# Patient Record
Sex: MALE | Race: WHITE | NOT HISPANIC OR LATINO | Employment: OTHER | URBAN - NONMETROPOLITAN AREA
[De-identification: names, ages, dates, MRNs, and addresses within clinical notes are randomized per-mention and may not be internally consistent; named-entity substitution may affect disease eponyms.]

---

## 2024-02-14 ENCOUNTER — HOSPITAL ENCOUNTER (OUTPATIENT)
Dept: MRI IMAGING | Facility: OTHER | Age: 74
Discharge: HOME | End: 2024-02-14
Attending: STUDENT IN AN ORGANIZED HEALTH CARE EDUCATION/TRAINING PROGRAM | Admitting: STUDENT IN AN ORGANIZED HEALTH CARE EDUCATION/TRAINING PROGRAM
Payer: MEDICARE

## 2024-02-14 DIAGNOSIS — R97.20 ELEVATED PSA: ICD-10-CM

## 2024-02-14 PROCEDURE — 76377 3D RENDER W/INTRP POSTPROCES: CPT

## 2024-02-14 PROCEDURE — A9575 INJ GADOTERATE MEGLUMI 0.1ML: HCPCS | Mod: JZ

## 2024-02-14 PROCEDURE — 255N000002 HC RX 255 OP 636: Mod: JZ

## 2024-02-14 RX ORDER — GADOTERATE MEGLUMINE 376.9 MG/ML
20 INJECTION INTRAVENOUS ONCE
Status: COMPLETED | OUTPATIENT
Start: 2024-02-14 | End: 2024-02-14

## 2024-02-14 RX ADMIN — GADOTERATE MEGLUMINE 20 ML: 376.9 INJECTION INTRAVENOUS at 17:17

## 2024-08-15 RX ORDER — METOPROLOL SUCCINATE 100 MG/1
100 TABLET, EXTENDED RELEASE ORAL DAILY
COMMUNITY

## 2024-08-15 RX ORDER — NIFEDIPINE 30 MG
30 TABLET, EXTENDED RELEASE ORAL DAILY
COMMUNITY

## 2024-08-15 RX ORDER — ALLOPURINOL 100 MG/1
100 TABLET ORAL DAILY
COMMUNITY

## 2024-08-15 RX ORDER — LISINOPRIL 30 MG/1
30 TABLET ORAL DAILY
COMMUNITY

## 2024-08-15 RX ORDER — COLCHICINE 0.6 MG/1
0.6 TABLET ORAL SEE ADMIN INSTRUCTIONS
COMMUNITY

## 2024-08-15 RX ORDER — VALACYCLOVIR HYDROCHLORIDE 1 G/1
1000 TABLET, FILM COATED ORAL DAILY PRN
Status: ON HOLD | COMMUNITY
End: 2024-08-19

## 2024-08-15 RX ORDER — ROSUVASTATIN CALCIUM 10 MG/1
10 TABLET, COATED ORAL DAILY
COMMUNITY

## 2024-08-17 ENCOUNTER — ANESTHESIA EVENT (OUTPATIENT)
Dept: SURGERY | Facility: HOSPITAL | Age: 74
End: 2024-08-17
Payer: MEDICARE

## 2024-08-19 ENCOUNTER — ANESTHESIA (OUTPATIENT)
Dept: SURGERY | Facility: HOSPITAL | Age: 74
End: 2024-08-19
Payer: MEDICARE

## 2024-08-19 ENCOUNTER — HOSPITAL ENCOUNTER (OUTPATIENT)
Facility: HOSPITAL | Age: 74
Discharge: HOME | End: 2024-08-20
Attending: UROLOGY | Admitting: UROLOGY
Payer: MEDICARE

## 2024-08-19 DIAGNOSIS — C61 PROSTATE CANCER (H): Primary | ICD-10-CM

## 2024-08-19 LAB — PLATELET # BLD AUTO: 171 10E3/UL (ref 150–450)

## 2024-08-19 PROCEDURE — 44180 LAP ENTEROLYSIS: CPT | Performed by: NURSE ANESTHETIST, CERTIFIED REGISTERED

## 2024-08-19 PROCEDURE — 272N000001 HC OR GENERAL SUPPLY STERILE: Performed by: UROLOGY

## 2024-08-19 PROCEDURE — 250N000011 HC RX IP 250 OP 636: Performed by: NURSE PRACTITIONER

## 2024-08-19 PROCEDURE — 36415 COLL VENOUS BLD VENIPUNCTURE: CPT | Performed by: UROLOGY

## 2024-08-19 PROCEDURE — 250N000009 HC RX 250: Performed by: NURSE ANESTHETIST, CERTIFIED REGISTERED

## 2024-08-19 PROCEDURE — 250N000013 HC RX MED GY IP 250 OP 250 PS 637: Performed by: UROLOGY

## 2024-08-19 PROCEDURE — 999N000141 HC STATISTIC PRE-PROCEDURE NURSING ASSESSMENT: Performed by: UROLOGY

## 2024-08-19 PROCEDURE — 250N000011 HC RX IP 250 OP 636: Performed by: UROLOGY

## 2024-08-19 PROCEDURE — 370N000017 HC ANESTHESIA TECHNICAL FEE, PER MIN: Performed by: UROLOGY

## 2024-08-19 PROCEDURE — 250N000011 HC RX IP 250 OP 636: Performed by: ANESTHESIOLOGY

## 2024-08-19 PROCEDURE — 258N000003 HC RX IP 258 OP 636: Performed by: ANESTHESIOLOGY

## 2024-08-19 PROCEDURE — 258N000003 HC RX IP 258 OP 636: Performed by: UROLOGY

## 2024-08-19 PROCEDURE — 85049 AUTOMATED PLATELET COUNT: CPT | Performed by: UROLOGY

## 2024-08-19 PROCEDURE — 250N000025 HC SEVOFLURANE, PER MIN: Performed by: UROLOGY

## 2024-08-19 PROCEDURE — 360N000080 HC SURGERY LEVEL 7, PER MIN: Performed by: UROLOGY

## 2024-08-19 PROCEDURE — 99100 ANES PT EXTEME AGE<1 YR&>70: CPT | Performed by: NURSE ANESTHETIST, CERTIFIED REGISTERED

## 2024-08-19 PROCEDURE — 250N000011 HC RX IP 250 OP 636: Performed by: NURSE ANESTHETIST, CERTIFIED REGISTERED

## 2024-08-19 PROCEDURE — 250N000013 HC RX MED GY IP 250 OP 250 PS 637: Performed by: ANESTHESIOLOGY

## 2024-08-19 PROCEDURE — 258N000003 HC RX IP 258 OP 636: Performed by: NURSE ANESTHETIST, CERTIFIED REGISTERED

## 2024-08-19 PROCEDURE — 272N000004 HC RX 272: Performed by: UROLOGY

## 2024-08-19 PROCEDURE — 250N000009 HC RX 250: Performed by: UROLOGY

## 2024-08-19 PROCEDURE — 44180 LAP ENTEROLYSIS: CPT | Performed by: ANESTHESIOLOGY

## 2024-08-19 PROCEDURE — 710N000009 HC RECOVERY PHASE 1, LEVEL 1, PER MIN: Performed by: UROLOGY

## 2024-08-19 PROCEDURE — 88309 TISSUE EXAM BY PATHOLOGIST: CPT | Mod: TC | Performed by: UROLOGY

## 2024-08-19 RX ORDER — ONDANSETRON 2 MG/ML
4 INJECTION INTRAMUSCULAR; INTRAVENOUS EVERY 30 MIN PRN
Status: DISCONTINUED | OUTPATIENT
Start: 2024-08-19 | End: 2024-08-19 | Stop reason: HOSPADM

## 2024-08-19 RX ORDER — DEXAMETHASONE SODIUM PHOSPHATE 4 MG/ML
4 INJECTION, SOLUTION INTRA-ARTICULAR; INTRALESIONAL; INTRAMUSCULAR; INTRAVENOUS; SOFT TISSUE
Status: DISCONTINUED | OUTPATIENT
Start: 2024-08-19 | End: 2024-08-19 | Stop reason: HOSPADM

## 2024-08-19 RX ORDER — BUPIVACAINE HYDROCHLORIDE 5 MG/ML
INJECTION, SOLUTION PERINEURAL PRN
Status: DISCONTINUED | OUTPATIENT
Start: 2024-08-19 | End: 2024-08-19 | Stop reason: HOSPADM

## 2024-08-19 RX ORDER — ACETAMINOPHEN 325 MG/1
650 TABLET ORAL EVERY 4 HOURS PRN
Status: DISCONTINUED | OUTPATIENT
Start: 2024-08-22 | End: 2024-08-20 | Stop reason: HOSPADM

## 2024-08-19 RX ORDER — NIFEDIPINE 30 MG
30 TABLET, EXTENDED RELEASE ORAL DAILY
Status: DISCONTINUED | OUTPATIENT
Start: 2024-08-20 | End: 2024-08-20 | Stop reason: HOSPADM

## 2024-08-19 RX ORDER — OXYCODONE HYDROCHLORIDE 5 MG/1
5 TABLET ORAL
Status: COMPLETED | OUTPATIENT
Start: 2024-08-19 | End: 2024-08-19

## 2024-08-19 RX ORDER — HYDROMORPHONE HCL IN WATER/PF 6 MG/30 ML
0.4 PATIENT CONTROLLED ANALGESIA SYRINGE INTRAVENOUS EVERY 5 MIN PRN
Status: DISCONTINUED | OUTPATIENT
Start: 2024-08-19 | End: 2024-08-19 | Stop reason: HOSPADM

## 2024-08-19 RX ORDER — ONDANSETRON 4 MG/1
4 TABLET, ORALLY DISINTEGRATING ORAL EVERY 6 HOURS PRN
Status: DISCONTINUED | OUTPATIENT
Start: 2024-08-19 | End: 2024-08-20 | Stop reason: HOSPADM

## 2024-08-19 RX ORDER — NALOXONE HYDROCHLORIDE 0.4 MG/ML
0.2 INJECTION, SOLUTION INTRAMUSCULAR; INTRAVENOUS; SUBCUTANEOUS
Status: DISCONTINUED | OUTPATIENT
Start: 2024-08-19 | End: 2024-08-20 | Stop reason: HOSPADM

## 2024-08-19 RX ORDER — PROPOFOL 10 MG/ML
INJECTION, EMULSION INTRAVENOUS PRN
Status: DISCONTINUED | OUTPATIENT
Start: 2024-08-19 | End: 2024-08-19

## 2024-08-19 RX ORDER — NALOXONE HYDROCHLORIDE 0.4 MG/ML
0.4 INJECTION, SOLUTION INTRAMUSCULAR; INTRAVENOUS; SUBCUTANEOUS
Status: DISCONTINUED | OUTPATIENT
Start: 2024-08-19 | End: 2024-08-20 | Stop reason: HOSPADM

## 2024-08-19 RX ORDER — MAGNESIUM SULFATE 4 G/50ML
4 INJECTION INTRAVENOUS ONCE
Status: COMPLETED | OUTPATIENT
Start: 2024-08-19 | End: 2024-08-19

## 2024-08-19 RX ORDER — CEFAZOLIN SODIUM/WATER 2 G/20 ML
2 SYRINGE (ML) INTRAVENOUS
Status: DISCONTINUED | OUTPATIENT
Start: 2024-08-19 | End: 2024-08-19 | Stop reason: HOSPADM

## 2024-08-19 RX ORDER — AMOXICILLIN 250 MG
1 CAPSULE ORAL 2 TIMES DAILY
Status: DISCONTINUED | OUTPATIENT
Start: 2024-08-19 | End: 2024-08-20 | Stop reason: HOSPADM

## 2024-08-19 RX ORDER — FENTANYL CITRATE 50 UG/ML
INJECTION, SOLUTION INTRAMUSCULAR; INTRAVENOUS PRN
Status: DISCONTINUED | OUTPATIENT
Start: 2024-08-19 | End: 2024-08-19

## 2024-08-19 RX ORDER — BISACODYL 10 MG
10 SUPPOSITORY, RECTAL RECTAL DAILY PRN
Status: DISCONTINUED | OUTPATIENT
Start: 2024-08-22 | End: 2024-08-20 | Stop reason: HOSPADM

## 2024-08-19 RX ORDER — LIDOCAINE 40 MG/G
CREAM TOPICAL
Status: DISCONTINUED | OUTPATIENT
Start: 2024-08-19 | End: 2024-08-19 | Stop reason: HOSPADM

## 2024-08-19 RX ORDER — SODIUM CHLORIDE 9 MG/ML
INJECTION, SOLUTION INTRAVENOUS CONTINUOUS
Status: DISCONTINUED | OUTPATIENT
Start: 2024-08-19 | End: 2024-08-20 | Stop reason: HOSPADM

## 2024-08-19 RX ORDER — CEFAZOLIN SODIUM/WATER 2 G/20 ML
2 SYRINGE (ML) INTRAVENOUS SEE ADMIN INSTRUCTIONS
Status: DISCONTINUED | OUTPATIENT
Start: 2024-08-19 | End: 2024-08-19 | Stop reason: HOSPADM

## 2024-08-19 RX ORDER — ONDANSETRON 4 MG/1
4 TABLET, ORALLY DISINTEGRATING ORAL EVERY 30 MIN PRN
Status: DISCONTINUED | OUTPATIENT
Start: 2024-08-19 | End: 2024-08-19 | Stop reason: HOSPADM

## 2024-08-19 RX ORDER — OXYCODONE HYDROCHLORIDE 5 MG/1
5 TABLET ORAL EVERY 4 HOURS PRN
Status: DISCONTINUED | OUTPATIENT
Start: 2024-08-19 | End: 2024-08-20 | Stop reason: HOSPADM

## 2024-08-19 RX ORDER — ONDANSETRON 2 MG/ML
4 INJECTION INTRAMUSCULAR; INTRAVENOUS EVERY 6 HOURS PRN
Status: DISCONTINUED | OUTPATIENT
Start: 2024-08-19 | End: 2024-08-20 | Stop reason: HOSPADM

## 2024-08-19 RX ORDER — HYDROMORPHONE HCL IN WATER/PF 6 MG/30 ML
0.2 PATIENT CONTROLLED ANALGESIA SYRINGE INTRAVENOUS
Status: DISCONTINUED | OUTPATIENT
Start: 2024-08-19 | End: 2024-08-20 | Stop reason: HOSPADM

## 2024-08-19 RX ORDER — KETAMINE HYDROCHLORIDE 10 MG/ML
INJECTION INTRAMUSCULAR; INTRAVENOUS PRN
Status: DISCONTINUED | OUTPATIENT
Start: 2024-08-19 | End: 2024-08-19

## 2024-08-19 RX ORDER — NALOXONE HYDROCHLORIDE 0.4 MG/ML
0.1 INJECTION, SOLUTION INTRAMUSCULAR; INTRAVENOUS; SUBCUTANEOUS
Status: DISCONTINUED | OUTPATIENT
Start: 2024-08-19 | End: 2024-08-19 | Stop reason: HOSPADM

## 2024-08-19 RX ORDER — FENTANYL CITRATE 50 UG/ML
50 INJECTION, SOLUTION INTRAMUSCULAR; INTRAVENOUS EVERY 5 MIN PRN
Status: DISCONTINUED | OUTPATIENT
Start: 2024-08-19 | End: 2024-08-19 | Stop reason: HOSPADM

## 2024-08-19 RX ORDER — ACETAMINOPHEN 325 MG/1
975 TABLET ORAL EVERY 8 HOURS
Status: DISCONTINUED | OUTPATIENT
Start: 2024-08-19 | End: 2024-08-20 | Stop reason: HOSPADM

## 2024-08-19 RX ORDER — PROCHLORPERAZINE MALEATE 5 MG
5 TABLET ORAL EVERY 6 HOURS PRN
Status: DISCONTINUED | OUTPATIENT
Start: 2024-08-19 | End: 2024-08-20 | Stop reason: HOSPADM

## 2024-08-19 RX ORDER — HYDROMORPHONE HCL IN WATER/PF 6 MG/30 ML
0.2 PATIENT CONTROLLED ANALGESIA SYRINGE INTRAVENOUS EVERY 5 MIN PRN
Status: DISCONTINUED | OUTPATIENT
Start: 2024-08-19 | End: 2024-08-19 | Stop reason: HOSPADM

## 2024-08-19 RX ORDER — SODIUM CHLORIDE, SODIUM LACTATE, POTASSIUM CHLORIDE, CALCIUM CHLORIDE 600; 310; 30; 20 MG/100ML; MG/100ML; MG/100ML; MG/100ML
INJECTION, SOLUTION INTRAVENOUS CONTINUOUS
Status: DISCONTINUED | OUTPATIENT
Start: 2024-08-19 | End: 2024-08-19 | Stop reason: HOSPADM

## 2024-08-19 RX ORDER — ONDANSETRON 2 MG/ML
INJECTION INTRAMUSCULAR; INTRAVENOUS PRN
Status: DISCONTINUED | OUTPATIENT
Start: 2024-08-19 | End: 2024-08-19

## 2024-08-19 RX ORDER — ACETAMINOPHEN 325 MG/1
975 TABLET ORAL ONCE
Status: DISCONTINUED | OUTPATIENT
Start: 2024-08-19 | End: 2024-08-19

## 2024-08-19 RX ORDER — FENTANYL CITRATE 50 UG/ML
25 INJECTION, SOLUTION INTRAMUSCULAR; INTRAVENOUS EVERY 5 MIN PRN
Status: DISCONTINUED | OUTPATIENT
Start: 2024-08-19 | End: 2024-08-19 | Stop reason: HOSPADM

## 2024-08-19 RX ORDER — ROSUVASTATIN CALCIUM 10 MG/1
10 TABLET, COATED ORAL DAILY
Status: DISCONTINUED | OUTPATIENT
Start: 2024-08-19 | End: 2024-08-20 | Stop reason: HOSPADM

## 2024-08-19 RX ORDER — HYDROMORPHONE HCL IN WATER/PF 6 MG/30 ML
0.4 PATIENT CONTROLLED ANALGESIA SYRINGE INTRAVENOUS
Status: DISCONTINUED | OUTPATIENT
Start: 2024-08-19 | End: 2024-08-20 | Stop reason: HOSPADM

## 2024-08-19 RX ORDER — PANTOPRAZOLE SODIUM 40 MG/1
40 TABLET, DELAYED RELEASE ORAL
Status: DISCONTINUED | OUTPATIENT
Start: 2024-08-20 | End: 2024-08-20 | Stop reason: HOSPADM

## 2024-08-19 RX ORDER — OXYCODONE HYDROCHLORIDE 5 MG/1
10 TABLET ORAL EVERY 4 HOURS PRN
Status: DISCONTINUED | OUTPATIENT
Start: 2024-08-19 | End: 2024-08-20 | Stop reason: HOSPADM

## 2024-08-19 RX ORDER — METOPROLOL SUCCINATE 100 MG/1
100 TABLET, EXTENDED RELEASE ORAL DAILY
Status: DISCONTINUED | OUTPATIENT
Start: 2024-08-20 | End: 2024-08-20 | Stop reason: HOSPADM

## 2024-08-19 RX ORDER — LIDOCAINE 40 MG/G
CREAM TOPICAL
Status: DISCONTINUED | OUTPATIENT
Start: 2024-08-19 | End: 2024-08-20 | Stop reason: HOSPADM

## 2024-08-19 RX ORDER — ALLOPURINOL 100 MG/1
100 TABLET ORAL DAILY
Status: DISCONTINUED | OUTPATIENT
Start: 2024-08-20 | End: 2024-08-20 | Stop reason: HOSPADM

## 2024-08-19 RX ORDER — SODIUM CHLORIDE, SODIUM LACTATE, POTASSIUM CHLORIDE, AND CALCIUM CHLORIDE .6; .31; .03; .02 G/100ML; G/100ML; G/100ML; G/100ML
IRRIGANT IRRIGATION PRN
Status: DISCONTINUED | OUTPATIENT
Start: 2024-08-19 | End: 2024-08-19 | Stop reason: HOSPADM

## 2024-08-19 RX ORDER — OXYCODONE HYDROCHLORIDE 5 MG/1
10 TABLET ORAL
Status: DISCONTINUED | OUTPATIENT
Start: 2024-08-19 | End: 2024-08-19 | Stop reason: HOSPADM

## 2024-08-19 RX ORDER — ACETAMINOPHEN 325 MG/1
975 TABLET ORAL ONCE
Status: COMPLETED | OUTPATIENT
Start: 2024-08-19 | End: 2024-08-19

## 2024-08-19 RX ORDER — LIDOCAINE HYDROCHLORIDE 10 MG/ML
INJECTION, SOLUTION INFILTRATION; PERINEURAL PRN
Status: DISCONTINUED | OUTPATIENT
Start: 2024-08-19 | End: 2024-08-19

## 2024-08-19 RX ORDER — DEXAMETHASONE SODIUM PHOSPHATE 10 MG/ML
INJECTION, SOLUTION INTRAMUSCULAR; INTRAVENOUS PRN
Status: DISCONTINUED | OUTPATIENT
Start: 2024-08-19 | End: 2024-08-19

## 2024-08-19 RX ORDER — POLYETHYLENE GLYCOL 3350 17 G/17G
17 POWDER, FOR SOLUTION ORAL DAILY
Status: DISCONTINUED | OUTPATIENT
Start: 2024-08-20 | End: 2024-08-20 | Stop reason: HOSPADM

## 2024-08-19 RX ADMIN — FENTANYL CITRATE 50 MCG: 50 INJECTION INTRAMUSCULAR; INTRAVENOUS at 07:37

## 2024-08-19 RX ADMIN — LIDOCAINE HYDROCHLORIDE 5 ML: 10 INJECTION, SOLUTION INFILTRATION; PERINEURAL at 07:30

## 2024-08-19 RX ADMIN — Medication 2 G: at 07:36

## 2024-08-19 RX ADMIN — SODIUM CHLORIDE: 9 INJECTION, SOLUTION INTRAVENOUS at 13:14

## 2024-08-19 RX ADMIN — ACETAMINOPHEN 975 MG: 325 TABLET ORAL at 20:02

## 2024-08-19 RX ADMIN — ROCURONIUM BROMIDE 10 MG: 50 INJECTION, SOLUTION INTRAVENOUS at 09:02

## 2024-08-19 RX ADMIN — OXYCODONE HYDROCHLORIDE 10 MG: 5 TABLET ORAL at 23:57

## 2024-08-19 RX ADMIN — PROPOFOL 150 MG: 10 INJECTION, EMULSION INTRAVENOUS at 07:30

## 2024-08-19 RX ADMIN — ROCURONIUM BROMIDE 50 MG: 50 INJECTION, SOLUTION INTRAVENOUS at 07:31

## 2024-08-19 RX ADMIN — MAGNESIUM SULFATE HEPTAHYDRATE 4 G: 80 INJECTION, SOLUTION INTRAVENOUS at 06:46

## 2024-08-19 RX ADMIN — LISINOPRIL 30 MG: 20 TABLET ORAL at 13:06

## 2024-08-19 RX ADMIN — ROCURONIUM BROMIDE 20 MG: 50 INJECTION, SOLUTION INTRAVENOUS at 08:15

## 2024-08-19 RX ADMIN — DEXAMETHASONE SODIUM PHOSPHATE 10 MG: 10 INJECTION, SOLUTION INTRAMUSCULAR; INTRAVENOUS at 07:43

## 2024-08-19 RX ADMIN — PROPOFOL 50 MG: 10 INJECTION, EMULSION INTRAVENOUS at 10:14

## 2024-08-19 RX ADMIN — Medication 250 MG: at 10:34

## 2024-08-19 RX ADMIN — SENNOSIDES AND DOCUSATE SODIUM 1 TABLET: 8.6; 5 TABLET ORAL at 20:02

## 2024-08-19 RX ADMIN — ROCURONIUM BROMIDE 20 MG: 50 INJECTION, SOLUTION INTRAVENOUS at 07:45

## 2024-08-19 RX ADMIN — ACETAMINOPHEN 975 MG: 325 TABLET ORAL at 13:04

## 2024-08-19 RX ADMIN — FENTANYL CITRATE 50 MCG: 50 INJECTION INTRAMUSCULAR; INTRAVENOUS at 07:30

## 2024-08-19 RX ADMIN — ROCURONIUM BROMIDE 10 MG: 50 INJECTION, SOLUTION INTRAVENOUS at 08:56

## 2024-08-19 RX ADMIN — OXYCODONE HYDROCHLORIDE 5 MG: 5 TABLET ORAL at 11:45

## 2024-08-19 RX ADMIN — SODIUM CHLORIDE, POTASSIUM CHLORIDE, SODIUM LACTATE AND CALCIUM CHLORIDE: 600; 310; 30; 20 INJECTION, SOLUTION INTRAVENOUS at 06:36

## 2024-08-19 RX ADMIN — ONDANSETRON 4 MG: 2 INJECTION INTRAMUSCULAR; INTRAVENOUS at 07:43

## 2024-08-19 RX ADMIN — SODIUM CHLORIDE, POTASSIUM CHLORIDE, SODIUM LACTATE AND CALCIUM CHLORIDE: 600; 310; 30; 20 INJECTION, SOLUTION INTRAVENOUS at 08:35

## 2024-08-19 RX ADMIN — PHENYLEPHRINE HYDROCHLORIDE 100 MCG: 10 INJECTION INTRAVENOUS at 07:53

## 2024-08-19 RX ADMIN — KETAMINE HYDROCHLORIDE 50 MG: 10 INJECTION INTRAMUSCULAR; INTRAVENOUS at 07:31

## 2024-08-19 RX ADMIN — ACETAMINOPHEN 975 MG: 325 TABLET ORAL at 06:44

## 2024-08-19 RX ADMIN — ROCURONIUM BROMIDE 5 MG: 50 INJECTION, SOLUTION INTRAVENOUS at 10:14

## 2024-08-19 ASSESSMENT — ACTIVITIES OF DAILY LIVING (ADL)
ADLS_ACUITY_SCORE: 29
ADLS_ACUITY_SCORE: 23
ADLS_ACUITY_SCORE: 22
ADLS_ACUITY_SCORE: 23
ADLS_ACUITY_SCORE: 22
ADLS_ACUITY_SCORE: 23
ADLS_ACUITY_SCORE: 22
ADLS_ACUITY_SCORE: 23
ADLS_ACUITY_SCORE: 22
ADLS_ACUITY_SCORE: 27
ADLS_ACUITY_SCORE: 22

## 2024-08-19 ASSESSMENT — COPD QUESTIONNAIRES: COPD: 1

## 2024-08-19 NOTE — ANESTHESIA CARE TRANSFER NOTE
Patient: Sonido Muñoz    Procedure: Procedure(s):  ROBOTIC RADICAL RETROPUBIC PROSTATECTOMY WITH BILATERAL PELVIC LYMPH NODE DISSECTION  LYSIS OF ADHESIONS, LAPAROSCOPIC       Diagnosis: Malignant tumor of prostate (H) [C61]  Diagnosis Additional Information: No value filed.    Anesthesia Type:   General     Note:    Oropharynx: oropharynx clear of all foreign objects and spontaneously breathing  Level of Consciousness: awake  Oxygen Supplementation: room air    Independent Airway: airway patency satisfactory and stable  Dentition: dentition unchanged  Vital Signs Stable: post-procedure vital signs reviewed and stable  Report to RN Given: handoff report given  Patient transferred to: PACU    Handoff Report: Identifed the Patient, Identified the Reponsible Provider, Reviewed the pertinent medical history, Discussed the surgical course, Reviewed Intra-OP anesthesia mangement and issues during anesthesia, Set expectations for post-procedure period and Allowed opportunity for questions and acknowledgement of understanding      Vitals:  Vitals Value Taken Time   /80 08/19/24 1047   Temp 36.2  C (97.16  F) 08/19/24 1048   Pulse 104 08/19/24 1048   Resp 8 08/19/24 1048   SpO2 95 % 08/19/24 1048   Vitals shown include unfiled device data.    Electronically Signed By: JASMIN Trejo CRNA  August 19, 2024  10:50 AM

## 2024-08-19 NOTE — PHARMACY-ADMISSION MEDICATION HISTORY
Pharmacist Admission Medication History    Admission medication history is complete. The information provided in this note is only as accurate as the sources available at the time of the update.    Information Source(s): Patient and CareEverywhere/SureScripts via in-person    Allergies reviewed with patient and updates made in EHR: yes    Medication History Completed By: Vianney Cristobal RPH 8/19/2024 6:59 AM    PTA Med List   Medication Sig Last Dose    allopurinol (ZYLOPRIM) 100 MG tablet Take 100 mg by mouth daily 8/19/2024 at am    apixaban ANTICOAGULANT (ELIQUIS) 5 MG tablet Take 5 mg by mouth 2 times daily 8/16/2024 at pm    colchicine (COLCRYS) 0.6 MG tablet Take 0.6 mg by mouth See Admin Instructions Take 2 tablets by mouth at onset of gout followed by one tablet per day for 3 to 5 days. More than a month at unknown    lisinopril (ZESTRIL) 30 MG tablet Take 30 mg by mouth daily 8/17/2024 at am    metoprolol succinate ER (TOPROL XL) 100 MG 24 hr tablet Take 100 mg by mouth daily 8/19/2024 at am    NIFEdipine ER (ADALAT CC) 30 MG 24 hr tablet Take 30 mg by mouth daily 8/19/2024 at am    omeprazole (PRILOSEC) 20 MG DR capsule Take 20 mg by mouth every morning (before breakfast) 8/19/2024 at am    rosuvastatin (CRESTOR) 10 MG tablet Take 10 mg by mouth daily 8/18/2024 at am

## 2024-08-19 NOTE — ANESTHESIA POSTPROCEDURE EVALUATION
Patient: Sonido Muñoz    Procedure: Procedure(s):  ROBOTIC RADICAL RETROPUBIC PROSTATECTOMY WITH BILATERAL PELVIC LYMPH NODE DISSECTION  LYSIS OF ADHESIONS, LAPAROSCOPIC       Anesthesia Type:  General    Note:  Disposition: Outpatient   Postop Pain Control: Uneventful            Sign Out: Well controlled pain   PONV: No   Neuro/Psych: Uneventful            Sign Out: Acceptable/Baseline neuro status   Airway/Respiratory: Uneventful            Sign Out: Acceptable/Baseline resp. status   CV/Hemodynamics: Uneventful            Sign Out: Acceptable CV status; No obvious hypovolemia; No obvious fluid overload   Other NRE: NONE   DID A NON-ROUTINE EVENT OCCUR? No           Last vitals:  Vitals Value Taken Time   /71 08/19/24 1130   Temp 35.9  C (96.62  F) 08/19/24 1133   Pulse 81 08/19/24 1133   Resp 15 08/19/24 1133   SpO2 94 % 08/19/24 1133   Vitals shown include unfiled device data.    Electronically Signed By: Laurence Whitfield MD  August 19, 2024  11:34 AM

## 2024-08-19 NOTE — ANESTHESIA PREPROCEDURE EVALUATION
"Anesthesia Pre-Procedure Evaluation    Patient: Sonido Muñoz   MRN: [id number] : 1950        Procedure : Procedure(s):  ROBOTIC RADICAL RETROPUBIC PROSTATECTOMY WITH BILATERAL PELVIC LYMPH NODE DISSECTION          Past Medical History:   Diagnosis Date    Arteriosclerotic cardiovascular disease (ASCVD)     COPD (chronic obstructive pulmonary disease) (H)     Dyspepsia     Gastroesophageal reflux disease     Hypertension     Idiopathic chronic gout without tophus, unspecified site     Mixed hyperlipidemia     Obese     Persistent atrial fibrillation (H)     Prostate cancer (H)       Past Surgical History:   Procedure Laterality Date    COLONOSCOPY      HERNIA REPAIR      ORTHOPEDIC SURGERY        No Known Allergies   Social History     Tobacco Use    Smoking status: Former     Types: Cigarettes    Smokeless tobacco: Never   Substance Use Topics    Alcohol use: Yes     Comment: 28 cans a week      Wt Readings from Last 1 Encounters:   24 114.8 kg (253 lb)        Anesthesia Evaluation   Pt has had prior anesthetic.         ROS/MED HX  ENT/Pulmonary:     (+)                          COPD,              Neurologic:       Cardiovascular:     (+)  hypertension- -   -  - -                                      METS/Exercise Tolerance:     Hematologic:       Musculoskeletal:       GI/Hepatic:     (+) GERD,                   Renal/Genitourinary:       Endo:     (+)               Obesity,       Psychiatric/Substance Use:       Infectious Disease:       Malignancy:       Other:            Physical Exam    Airway        Mallampati: II    Neck ROM: full     Respiratory Devices and Support         Dental       (+) Minor Abnormalities - some fillings, tiny chips      Cardiovascular   cardiovascular exam normal          Pulmonary   pulmonary exam normal                OUTSIDE LABS:  CBC: No results found for: \"WBC\", \"HGB\", \"HCT\", \"PLT\"  BMP: No results found for: \"NA\", \"POTASSIUM\", \"CHLORIDE\", \"CO2\", \"BUN\", \"CR\", " "\"GLC\"  COAGS: No results found for: \"PTT\", \"INR\", \"FIBR\"  POC: No results found for: \"BGM\", \"HCG\", \"HCGS\"  HEPATIC: No results found for: \"ALBUMIN\", \"PROTTOTAL\", \"ALT\", \"AST\", \"GGT\", \"ALKPHOS\", \"BILITOTAL\", \"BILIDIRECT\", \"FAVIOLA\"  OTHER: No results found for: \"PH\", \"LACT\", \"A1C\", \"CECI\", \"PHOS\", \"MAG\", \"LIPASE\", \"AMYLASE\", \"TSH\", \"T4\", \"T3\", \"CRP\", \"SED\"    Anesthesia Plan    ASA Status:  3       Anesthesia Type: General.     - Airway: ETT              Consents    Anesthesia Plan(s) and associated risks, benefits, and realistic alternatives discussed. Questions answered and patient/representative(s) expressed understanding.     - Discussed: Risks, Benefits and Alternatives for the PROCEDURE were discussed     - Discussed with:       - Extended Intubation/Ventilatory Support Discussed: No.      - Patient is DNR/DNI Status: No     Use of blood products discussed: No .     Postoperative Care    Pain management: Multi-modal analgesia.   PONV prophylaxis: Ondansetron (or other 5HT-3), Dexamethasone or Solumedrol     Comments:               Laurence Whitfield MD    I have reviewed the pertinent notes and labs in the chart from the past 30 days and (re)examined the patient.  Any updates or changes from those notes are reflected in this note.            # Drug Induced Coagulation Defect: home medication list includes an anticoagulant medication       "

## 2024-08-19 NOTE — PLAN OF CARE
"Goal Outcome Evaluation:    Problem: Adult Inpatient Plan of Care  Goal: Patient-Specific Goal (Individualized)  Description: You can add care plan individualizations to a care plan. Examples of Individualization might be:  \"Parent requests to be called daily at 9am for status\", \"I have a hard time hearing out of my right ear\", or \"Do not touch me to wake me up as it startles  me\".  Outcome: Progressing     Problem: Adult Inpatient Plan of Care  Goal: Optimal Comfort and Wellbeing  Outcome: Progressing     Problem: Pain Acute  Goal: Optimal Pain Control and Function  Outcome: Progressing    A/O x4. VSS. Denied any significant pain. Mann output 100 ml sanguineous in color.                             "

## 2024-08-19 NOTE — ANESTHESIA PROCEDURE NOTES
Airway       Patient location during procedure: OR       Procedure Start/Stop Times: 8/19/2024 7:35 AM  Staff -        Anesthesiologist:  Laurence Whitfield MD       CRNA: Juan Luis Garrett APRN CRNA       Performed By: anesthesiologist and CRNAIndications and Patient Condition       Indications for airway management: jt-procedural         Mask difficulty assessment: 2 - vent by mask + OA or adjuvant +/- NMBA    Final Airway Details       Final airway type: endotracheal airway       Successful airway: ETT - single  Endotracheal Airway Details        ETT size (mm): 7.5       Successful intubation technique: direct laryngoscopy       DL Blade Type: MAC 4       Grade View of Cords: 1       Position: Right       Measured from: gums/teeth       Secured at (cm): 22       Bite block used: None    Post intubation assessment        Placement verified by: capnometry, equal breath sounds and chest rise        Number of attempts at approach: 1       Number of other approaches attempted: 0       Secured with: tape       Ease of procedure: easy       Dentition: Intact and Unchanged    Medication(s) Administered   Medication Administration Time: 8/19/2024 7:35 AM

## 2024-08-19 NOTE — INTERVAL H&P NOTE
I have reviewed the surgical (or preoperative) H&P that is linked to this encounter, and examined the patient. There are no significant changes    Clinical Conditions Present on Arrival:  Clinically Significant Risk Factors Present on Admission                # Drug Induced Coagulation Defect: home medication list includes an anticoagulant medication

## 2024-08-19 NOTE — OP NOTE
Operative Note    Name:  Sonido Hendrixrafaelolga  PCP:  Rick Padron  Procedure Date:  8/19/2024  Location: [facility] OR    Procedure: Robot assisted radical prostatectomy with bilateral pelvic lymph node dissection    Pre-Procedure Diagnosis:  Malignant tumor of prostate     Post-Procedure Diagnosis:    Prostate cancer    Surgeon(s):  Aniket Tristan MD Polcari, Anthony John, MD      Anesthesia Type:  General      Past Medical History:   Diagnosis Date    Arteriosclerotic cardiovascular disease (ASCVD)     COPD (chronic obstructive pulmonary disease) (H)     Dyspepsia     Gastroesophageal reflux disease     Hypertension     Idiopathic chronic gout without tophus, unspecified site     Mixed hyperlipidemia     Obese     Persistent atrial fibrillation (H)     Prostate cancer (H)        Patient Active Problem List    Diagnosis Date Noted    Prostate cancer (H) 08/19/2024     Priority: Medium       Findings:  No obvious LAD or EPE  Very large prostate gland      Operative Report:      After informed consent was obtained, the patient underwent induction of general anesthesia.  He was placed in the dorsal lithotomy and then prepped and draped in standard sterile fashion. A wilkinson catheter was placed.  The Veress needle was placed through a stab incision below the umbilicus and the abdomen was insufflated to 15 mmHg.  An 8mm port was placed under direct vision to look into the abdomen.  Under direct vision two, 8 mm ports were placed on the left, and 8 mm, 12mm and a 5mm ports were placed on the right.  The robot was then docked.     The sigmoid colon was reflected medially by incising along the white line of Toldt.  The posterior peritoneum behind the bladder was incised and the right left vas deferens were identified.  A small portion of vas was excised and removed bilaterally.  Using the 30-up Lens the plane was developed between the rectum and the prostate to the apex of the prostate.    The seminal vesicles were  then then dissected free.  A Ray-jennifer was placed in the pelvis.    The bladder was sharply and bluntly taken down to the endopelvic fascia.      The endopelvic fascia was then incised and the puboprostatic ligaments were ligated.  A dorsal venous complex stitch ×2 with a suspension suture to the pubic bone was placed.    The bladder neck was then identified and scored down to the wilkinson catheter.  The balloon was deflated.  The posterior bladder was identified and scored down to the seminal vesicles.  The vascular pedicles were progressively isolated and clipped with the enseal device.      Using the 0 lens the apex was dissected of down to the urethra.  The urethra was transected.  The prostate was bagged.      A bilateral pelvic lymph node dissection was performed.  The obturator nerve was visualized and spared bilaterally.  Clips were used to control the lymphatics.The lymph nodes were bagged along with the Ray-Jennifer sponge.    The bladder neck was reconstructed using 2-0 vicryl.  The anastomosis was completed between the bladder and the urethra using 3-0 Monocryl.  The wilkinson was placed, irrigated and noted to be watertight.  Sponge and needle count was correct.    The robot was undocked.  A 19 Fr drain was placed through the left lateralmost trocar.  The specimen bag was brought to the midline port.  The trocars were removed.  The specimen was delivered.  The ports were infiltrated with Marcaine.  The fascia was closed in the midline port with interrupted #1 Vicryl.  The 12mm RLQ trocar site was closed using 0 vicryl. All skin incisions were closed with 4-0 monocryl.  The patient was awoken from anesthesia.  He tolerated the procedure well and was brought to the recovery room in stable condition.    Estimated Blood Loss:   50cc    Specimens:    ID Type Source Tests Collected by Time Destination   1 : RIGHT PELVIC LYMPH NODE Tissue Lymph Node(s), Pelvis, Right SURGICAL PATHOLOGY EXAM Antonino Bain MD  8/19/2024  8:52 AM    2 : LEFT PELVIC LYMPH NODE Tissue Lymph Node(s), Pelvis, Left SURGICAL PATHOLOGY EXAM Antonino Bain MD 8/19/2024  8:54 AM    3 : PROSTATE AND SEMINAL VESICLES Tissue Prostate SURGICAL PATHOLOGY EXAM Antonino Bain MD 8/19/2024 10:21 AM           Drains:   Urethral Catheter 08/19/24 Double-lumen;Latex 18 fr (Active)   Tube Description UTV 08/19/24 1055   Collection Container Standard 08/19/24 1055   Securement Method Leg strap 08/19/24 1055   Rationale for Continued Use Surgical procedure 08/19/24 1055       Antonino Bain MD

## 2024-08-20 VITALS
RESPIRATION RATE: 18 BRPM | TEMPERATURE: 98.2 F | HEIGHT: 71 IN | HEART RATE: 102 BPM | OXYGEN SATURATION: 96 % | WEIGHT: 253 LBS | SYSTOLIC BLOOD PRESSURE: 147 MMHG | DIASTOLIC BLOOD PRESSURE: 74 MMHG | BODY MASS INDEX: 35.42 KG/M2

## 2024-08-20 LAB
ANION GAP SERPL CALCULATED.3IONS-SCNC: 8 MMOL/L (ref 7–15)
BUN SERPL-MCNC: 14.1 MG/DL (ref 8–23)
CALCIUM SERPL-MCNC: 8.4 MG/DL (ref 8.8–10.4)
CHLORIDE SERPL-SCNC: 104 MMOL/L (ref 98–107)
CREAT SERPL-MCNC: 0.97 MG/DL (ref 0.67–1.17)
EGFRCR SERPLBLD CKD-EPI 2021: 83 ML/MIN/1.73M2
GLUCOSE SERPL-MCNC: 140 MG/DL (ref 70–99)
HCO3 SERPL-SCNC: 25 MMOL/L (ref 22–29)
HGB BLD-MCNC: 13.5 G/DL (ref 13.3–17.7)
POTASSIUM SERPL-SCNC: 4.8 MMOL/L (ref 3.4–5.3)
SODIUM SERPL-SCNC: 137 MMOL/L (ref 135–145)

## 2024-08-20 PROCEDURE — 258N000003 HC RX IP 258 OP 636: Performed by: UROLOGY

## 2024-08-20 PROCEDURE — 85018 HEMOGLOBIN: CPT | Performed by: UROLOGY

## 2024-08-20 PROCEDURE — 80048 BASIC METABOLIC PNL TOTAL CA: CPT | Performed by: UROLOGY

## 2024-08-20 PROCEDURE — 36415 COLL VENOUS BLD VENIPUNCTURE: CPT | Performed by: UROLOGY

## 2024-08-20 PROCEDURE — 250N000013 HC RX MED GY IP 250 OP 250 PS 637: Performed by: UROLOGY

## 2024-08-20 RX ORDER — ACETAMINOPHEN 325 MG/1
325-650 TABLET ORAL EVERY 4 HOURS PRN
COMMUNITY
Start: 2024-08-22

## 2024-08-20 RX ORDER — POLYETHYLENE GLYCOL 3350 17 G/17G
17 POWDER, FOR SOLUTION ORAL DAILY
Qty: 238 G | Refills: 0 | Status: SHIPPED | OUTPATIENT
Start: 2024-08-20 | End: 2024-09-03

## 2024-08-20 RX ORDER — BACITRACIN ZINC 500 [USP'U]/G
OINTMENT TOPICAL 4 TIMES DAILY PRN
COMMUNITY
Start: 2024-08-20

## 2024-08-20 RX ORDER — OXYCODONE HYDROCHLORIDE 5 MG/1
5 TABLET ORAL EVERY 6 HOURS PRN
Qty: 8 TABLET | Refills: 0 | Status: SHIPPED | OUTPATIENT
Start: 2024-08-20

## 2024-08-20 RX ADMIN — ACETAMINOPHEN 975 MG: 325 TABLET ORAL at 13:42

## 2024-08-20 RX ADMIN — NIFEDIPINE 30 MG: 30 TABLET, EXTENDED RELEASE ORAL at 09:39

## 2024-08-20 RX ADMIN — SENNOSIDES AND DOCUSATE SODIUM 1 TABLET: 8.6; 5 TABLET ORAL at 09:39

## 2024-08-20 RX ADMIN — ACETAMINOPHEN 975 MG: 325 TABLET ORAL at 05:56

## 2024-08-20 RX ADMIN — SODIUM CHLORIDE: 9 INJECTION, SOLUTION INTRAVENOUS at 03:08

## 2024-08-20 RX ADMIN — POLYETHYLENE GLYCOL 3350 17 G: 17 POWDER, FOR SOLUTION ORAL at 09:39

## 2024-08-20 RX ADMIN — OXYCODONE HYDROCHLORIDE 10 MG: 5 TABLET ORAL at 09:56

## 2024-08-20 RX ADMIN — PANTOPRAZOLE SODIUM 40 MG: 40 TABLET, DELAYED RELEASE ORAL at 09:40

## 2024-08-20 RX ADMIN — ALLOPURINOL 100 MG: 100 TABLET ORAL at 09:40

## 2024-08-20 RX ADMIN — METOPROLOL SUCCINATE 100 MG: 100 TABLET, EXTENDED RELEASE ORAL at 09:39

## 2024-08-20 RX ADMIN — ROSUVASTATIN 10 MG: 10 TABLET, FILM COATED ORAL at 09:39

## 2024-08-20 RX ADMIN — LISINOPRIL 30 MG: 20 TABLET ORAL at 09:39

## 2024-08-20 ASSESSMENT — ACTIVITIES OF DAILY LIVING (ADL)
ADLS_ACUITY_SCORE: 29

## 2024-08-20 NOTE — PROGRESS NOTES
Pt was given discharge instructions and teaching on how to care for wilkinson at home.   All questions answered.   He decline wheel chair and walked to the main entrance for his wife to pick him up.

## 2024-08-20 NOTE — PLAN OF CARE
"PRIMARY DIAGNOSIS: \"GENERIC\" NURSING  OUTPATIENT/OBSERVATION GOALS TO BE MET BEFORE DISCHARGE:  ADLs back to baseline: Yes    Activity and level of assistance: Ambulating independently in hallway    Pain status: Pain is minimal, declined pain meds    Return to near baseline physical activity: Yes     Discharge Planner Nurse   Safe discharge environment identified: No  Barriers to discharge: Yes       Entered by: Tello Whitehead RN 08/19/2024 11:04 PM     Please review provider order for any additional goals.   Nurse to notify provider when observation goals have been met and patient is ready for discharge.    Pt denies nausea. Mann patent and draining properly. Sat up in chair also. IVF infusing. Lap sites x7, CDI.   "

## 2024-08-20 NOTE — PLAN OF CARE
Problem: Adult Inpatient Plan of Care  Goal: Plan of Care Review  Description: The Plan of Care Review/Shift note should be completed every shift.  The Outcome Evaluation is a brief statement about your assessment that the patient is improving, declining, or no change.  This information will be displayed automatically on your shift  note.  8/20/2024 0623 by Roe Coughlin RN  Outcome: Progressing  Flowsheets (Taken 8/20/2024 0623)  Outcome Evaluation: pt given prn for pain with relief.  Overall Patient Progress: improving  8/19/2024 2201 by Roe Coughlin RN  Outcome: Progressing  Flowsheets (Taken 8/19/2024 2201)  Outcome Evaluation: pt ambulating with very little discomfort.  Plan of Care Reviewed With: patient  Overall Patient Progress: improving  Goal: Absence of Hospital-Acquired Illness or Injury  Outcome: Progressing  Intervention: Prevent Skin Injury  Recent Flowsheet Documentation  Taken 8/20/2024 0357 by Roe Coughlin RN  Body Position: position changed independently  Taken 8/20/2024 0001 by Roe Coughlin RN  Body Position: position changed independently  Taken 8/19/2024 2038 by Roe Coughlin RN  Body Position: position changed independently  Intervention: Prevent and Manage VTE (Venous Thromboembolism) Risk  Recent Flowsheet Documentation  Taken 8/20/2024 0357 by Roe Coughlin RN  VTE Prevention/Management: SCDs on (sequential compression devices)  Taken 8/20/2024 0001 by Roe Coughlin RN  VTE Prevention/Management: SCDs on (sequential compression devices)  Taken 8/19/2024 2038 by Roe Coughlin RN  VTE Prevention/Management: SCDs on (sequential compression devices)  Intervention: Prevent Infection  Recent Flowsheet Documentation  Taken 8/20/2024 0357 by Roe Coughlin RN  Infection Prevention: hand hygiene promoted  Taken 8/20/2024 0001 by Roe Coughlin RN  Infection Prevention: hand hygiene promoted  Goal: Optimal Comfort and Wellbeing  8/20/2024  0623 by Roe Coughlin RN  Outcome: Progressing  8/19/2024 2201 by Roe Coughlin RN  Outcome: Progressing  Intervention: Monitor Pain and Promote Comfort  Recent Flowsheet Documentation  Taken 8/20/2024 0357 by Roe Coughlin RN  Pain Management Interventions:   repositioned   relaxation techniques promoted   rest  Taken 8/20/2024 0120 by Roe Coughlin RN  Pain Management Interventions: medication (see MAR)  Taken 8/20/2024 0045 by Roe Coughlin RN  Pain Management Interventions:   repositioned   relaxation techniques promoted   rest  Taken 8/20/2024 0001 by Roe Coughlin RN  Pain Management Interventions: medication (see MAR)  Taken 8/19/2024 2038 by Roe Coughlin RN  Pain Management Interventions: medication (see MAR)     Problem: Pain Acute  Goal: Optimal Pain Control and Function  8/20/2024 0623 by Roe Coughlin RN  Outcome: Progressing  8/19/2024 2201 by Roe Coughlin RN  Outcome: Progressing  Intervention: Develop Pain Management Plan  Recent Flowsheet Documentation  Taken 8/20/2024 0357 by Roe Coughlin RN  Pain Management Interventions:   repositioned   relaxation techniques promoted   rest  Taken 8/20/2024 0120 by Roe Coughlin RN  Pain Management Interventions: medication (see MAR)  Taken 8/20/2024 0045 by Roe Coughlin RN  Pain Management Interventions:   repositioned   relaxation techniques promoted   rest  Taken 8/20/2024 0001 by Roe Coughlin RN  Pain Management Interventions: medication (see MAR)  Taken 8/19/2024 2038 by Roe Coughlin RN  Pain Management Interventions: medication (see MAR)   Goal Outcome Evaluation: pt ambulating with SBA. Increased fluid intake resulting in increase void and lightening of urine to light red/pink.      Plan of Care Reviewed With: patient    Overall Patient Progress: improvingOverall Patient Progress: improving    Outcome Evaluation: pt given prn for pain with relief.

## 2024-08-20 NOTE — PLAN OF CARE
Problem: Adult Inpatient Plan of Care  Goal: Plan of Care Review  Description: The Plan of Care Review/Shift note should be completed every shift.  The Outcome Evaluation is a brief statement about your assessment that the patient is improving, declining, or no change.  This information will be displayed automatically on your shift  note.  Outcome: Progressing  Flowsheets (Taken 8/19/2024 2201)  Outcome Evaluation: pt ambulating with very little discomfort.  Plan of Care Reviewed With: patient  Overall Patient Progress: improving  Goal: Optimal Comfort and Wellbeing  Outcome: Progressing  Intervention: Monitor Pain and Promote Comfort  Recent Flowsheet Documentation  Taken 8/19/2024 2038 by Roe Coughlin RN  Pain Management Interventions: medication (see MAR)     Problem: Pain Acute  Goal: Optimal Pain Control and Function  Outcome: Progressing  Intervention: Develop Pain Management Plan  Recent Flowsheet Documentation  Taken 8/19/2024 2038 by Roe Coughlin RN  Pain Management Interventions: medication (see MAR)   PRIMARY DIAGNOSIS: prostatectomy  OUTPATIENT/OBSERVATION GOALS TO BE MET BEFORE DISCHARGE:  1. Stable vital signs Yes  2. Tolerating diet:Yes  3. Pain controlled with oral pain medications:  Yes  4. Positive bowel sounds:  Yes  5. Voiding without difficulty:  Yes  6. Able to ambulate:  Yes  7. Provider specific discharge goals met:  No urine still bloody (light red)    Discharge Planner Nurse   Safe discharge environment identified: Yes  Barriers to discharge: Yes       Entered by: Roe Coughlin RN 08/19/2024 10:06 PM     Please review provider order for any additional goals.   Nurse to notify provider when observation goals have been met and patient is ready for discharge.Goal Outcome Evaluation:      Plan of Care Reviewed With: patient    Overall Patient Progress: improvingOverall Patient Progress: improving    Outcome Evaluation: pt ambulating with very little discomfort.

## 2024-08-20 NOTE — PROGRESS NOTES
PRIMARY DIAGNOSIS: ACUTE PAIN  OUTPATIENT/OBSERVATION GOALS TO BE MET BEFORE DISCHARGE:  1. Pain Status: Improved-controlled with oral pain medications.    2. Return to near baseline physical activity: Yes    3. Cleared for discharge by consultants (if involved): No    Discharge Planner Nurse   Safe discharge environment identified: Yes  Barriers to discharge: Pending pt's response to pain management       Entered by: Beverly Maldonado RN 08/20/2024 10:48 AM    Please review provider order for any additional goals.   Nurse to notify provider when observation goals have been met and patient is ready for discharge.

## 2024-08-20 NOTE — PLAN OF CARE
PRIMARY DIAGNOSIS: Prostatectomy  OUTPATIENT/OBSERVATION GOALS TO BE MET BEFORE DISCHARGE:  1. Stable vital signs Yes  2. Tolerating diet:Yes  3. Pain controlled with oral pain medications:  Yes  4. Positive bowel sounds:  Yes  5. Voiding without difficulty:  Yes  6. Able to ambulate:  Yes  7. Provider specific discharge goals met:  No    Discharge Planner Nurse   Safe discharge environment identified: Yes  Barriers to discharge: Yes       Entered by: Roe Coughlin RN 08/20/2024 12:52 AM     Please review provider order for any additional goals.   Nurse to notify provider when observation goals have been met and patient is ready for discharge.Goal Outcome Evaluation:      Plan of Care Reviewed With: patient    Overall Patient Progress: improvingOverall Patient Progress: improving    Outcome Evaluation: pt ambulating with very little discomfort.    Pt is alert and oriented. Pt c/o abdominal pain with movement. PRN given with relief. Pt ambulating with SBA. VSS. RA. Incision sites CDI. Mann catheter secure and patent with bloody output. Pt encouraged to drink more fluids. N/S infusing at 75 ml/hr.

## 2024-08-20 NOTE — DISCHARGE SUMMARY
[facility] DISCHARGE SUMMARY    Name: Sonido Muñoz  : 1950  MRN: [id number]  PCP: Rick Padron    Place of service: [facility] [place]    Admission date: 2024   Discharge date: 2024     Surgeon: Dr Antonino Bain     Surgical Procedure: Robot assisted radical prostatectomy with bilateral pelvic lymph node dissection     Date of surgery: 2024    Principal Diagnosis at Discharge:   Malignant tumor of prostate (H) [C61]     Other diagnosis addressed during hospitalization:  Chronic Afib    Consults:  None    Diagnostic Studies :  None    Complications while in the Hospital:  None    Physical Exam:  Temp: 98.2  F (36.8  C) Temp src: Oral BP: (!) 147/74 Pulse: 102   Resp: 18 SpO2: 96 % O2 Device: None (Room air)      Gen: nad, alert  Neuro: A&O x 3. Moving all extremities equally.   Resp: Reg resp rate/depth.   Abdomen: appropriately tender, mildly distended, no rebound or peritoneal signs  Incisions: C/D/I, closed with subcutaneous suture/skin glue, ecchymosis without palpable hematomas noted at incicions sites. Drop of blood from right lower abdominal incision, covered with 2 x 2 gauze dressing.   :  Penis and scrotum without erythema or edema. Mann draining clear light peach to cristino colored urine, adequate output.   LE: CWMS intact. Bilateral LE hyperpigmented below knee through ankles, +1 edema bilateral LE (pt reports chronic).     Brief history of hospital course:  This is a 74 year old male admitted for Malignant tumor of prostate (H) [C61]. Patient underwent above procedure. Patient tolerated the procedure well. Post operative course was remarkable for mild tachycardia (max 113 bmp) in setting of chronic afib on POD#1 AM, improved (HR ) following pain medication and metoprolol. BP and Hgb stable . By the day of discharge, the patient was ambulatory, tolerating diet, pain was controlled with oral analgesics, labs and vitals stable, passing flatus. Discharged with  "wilkinson catheter.     Labs:  Hemoglobin   Date Value Ref Range Status   08/20/2024 13.5 13.3 - 17.7 g/dL Final     Platelet Count   Date Value Ref Range Status   08/19/2024 171 150 - 450 10e3/uL Final     No results found for: \"WBC\"  Creatinine   Date Value Ref Range Status   08/20/2024 0.97 0.67 - 1.17 mg/dL Final     Potassium   Date Value Ref Range Status   08/20/2024 4.8 3.4 - 5.3 mmol/L Final     No results found for: \"INR\"     Pending Labs:  Surgical Pathology     DISPOSITION: Home    DISCHARGE CONDITION: Good/Stable    DISCHARGE MEDICATIONS:      Review of your medicines        START taking        Dose / Directions   acetaminophen 325 MG tablet  Commonly known as: TYLENOL      Dose: 325-650 mg  Start taking on: August 22, 2024  Take 1-2 tablets (325-650 mg) by mouth every 4 hours as needed for other or mild pain (For optimal non-opioid multimodal pain management to improve pain control.)  Refills: 0     bacitracin 500 UNIT/GM external ointment      Apply topically 4 times daily as needed for wound care Apply to tip of penis while wilkinson catheter is present.  Refills: 0     oxyCODONE 5 MG tablet  Commonly known as: ROXICODONE      Dose: 5 mg  Take 1 tablet (5 mg) by mouth every 6 hours as needed for moderate to severe pain  Quantity: 8 tablet  Refills: 0     polyethylene glycol 17 GM/Dose powder  Commonly known as: MIRALAX      Dose: 17 g  Take 17 g by mouth daily for 14 days Discontinue if your stools become loose.  Quantity: 238 g  Refills: 0            CONTINUE these medicines which may have CHANGED, or have new prescriptions. If we are uncertain of the size of tablets/capsules you have at home, strength may be listed as something that might have changed.        Dose / Directions   apixaban ANTICOAGULANT 5 MG tablet  Commonly known as: ELIQUIS  This may have changed: These instructions start on September 2, 2024. If you are unsure what to do until then, ask your doctor or other care provider.      Dose: 5 " mg  Start taking on: September 2, 2024  Take 1 tablet (5 mg) by mouth 2 times daily  Refills: 0            CONTINUE these medicines which have NOT CHANGED        Dose / Directions   allopurinol 100 MG tablet  Commonly known as: ZYLOPRIM      Dose: 100 mg  Take 100 mg by mouth daily  Refills: 0     colchicine 0.6 MG tablet  Commonly known as: COLCRYS      Dose: 0.6 mg  Take 0.6 mg by mouth See Admin Instructions Take 2 tablets by mouth at onset of gout followed by one tablet per day for 3 to 5 days.  Refills: 0     lisinopril 30 MG tablet  Commonly known as: ZESTRIL      Dose: 30 mg  Take 30 mg by mouth daily  Refills: 0     metoprolol succinate  MG 24 hr tablet  Commonly known as: TOPROL XL      Dose: 100 mg  Take 100 mg by mouth daily  Refills: 0     NIFEdipine ER 30 MG 24 hr tablet  Commonly known as: ADALAT CC      Dose: 30 mg  Take 30 mg by mouth daily  Refills: 0     omeprazole 20 MG DR capsule  Commonly known as: PriLOSEC      Dose: 20 mg  Take 20 mg by mouth every morning (before breakfast)  Refills: 0     rosuvastatin 10 MG tablet  Commonly known as: CRESTOR      Dose: 10 mg  Take 10 mg by mouth daily  Refills: 0               Where to get your medicines        These medications were sent to [facility] [address] [address]      Phone: [phone]   oxyCODONE 5 MG tablet  polyethylene glycol 17 GM/Dose powder       Some of these will need a paper prescription and others can be bought over the counter. Ask your nurse if you have questions.    You don't need a prescription for these medications  acetaminophen 325 MG tablet  bacitracin 500 UNIT/GM external ointment         DISCHARGE PLAN:   - Follow up with Dr Antonino Bain as scheduled prior to your procedure   - Follow up with Rick Padron as directed   - Take medication as prescribed, avoid anticoagulants per surgeon and PCP recommendations.   - Wound /  drain care: As directed prior to discharge  - Physical activity: As tolerated, no heavy lifting. No driving while taking narcotics.   - Diet: Clear liquid diet, advance to full liquid diet when passing gas and are free of nausea. You may advance to regular diet when you are passing gas regularly and as long as you remain without nausea.   - Medication: please review discharge Med req/AVS  - Warning signs discussed with patient about when to call the clinic/hospital  - All questions and concerns were answered for the patient prior to discharge  - Patient verbalized understanding.     Discussed patient with Dr Antonino Bain on day of discharge.     Arsh Caballero, JASMIN, CNP  [facility]   [phone]     I saw the patient on the date of discharge  Total time spent for discharge on date of discharge: 20 minutes    Physician(s) in addition to primary physician who should receive a copy:  CC1: Rick Padron           ?

## 2024-08-24 LAB
PATH REPORT.COMMENTS IMP SPEC: ABNORMAL
PATH REPORT.COMMENTS IMP SPEC: ABNORMAL
PATH REPORT.COMMENTS IMP SPEC: YES
PATH REPORT.FINAL DX SPEC: ABNORMAL
PATH REPORT.GROSS SPEC: ABNORMAL
PATH REPORT.MICROSCOPIC SPEC OTHER STN: ABNORMAL
PATH REPORT.RELEVANT HX SPEC: ABNORMAL
PATHOLOGY SYNOPTIC REPORT: ABNORMAL
PHOTO IMAGE: ABNORMAL

## 2024-08-24 PROCEDURE — 88307 TISSUE EXAM BY PATHOLOGIST: CPT | Mod: 26 | Performed by: PATHOLOGY

## 2024-08-24 PROCEDURE — 88309 TISSUE EXAM BY PATHOLOGIST: CPT | Mod: 26 | Performed by: PATHOLOGY

## (undated) DEVICE — HANDPIECE ENSEAL G2 45CM CURVED

## (undated) DEVICE — GLOVE BIOGEL INDICATOR 7.5 LF 41675

## (undated) DEVICE — SYR 50ML SLIP TIP W/O NDL 309654

## (undated) DEVICE — CUSTOM PACK DA VINCI SBA5BDVHEA

## (undated) DEVICE — ENDO TROCAR CONMED AIRSEAL BLADELESS 12X120MM IAS12-120LP

## (undated) DEVICE — SUCTION STRYKERFLOW II 250-070-500

## (undated) DEVICE — SUCTION MANIFOLD NEPTUNE 2 SYS 1 PORT 702-025-000

## (undated) DEVICE — TUBING FILTER TRI-LUMEN AIRSEAL ASC-EVAC1

## (undated) DEVICE — SUTURE VICRYL+ 2-0 27IN SH UND VCP417H

## (undated) DEVICE — SU VICRYL 3-0 RB-1 27" J305H

## (undated) DEVICE — SU DERMABOND ADVANCED .7ML DNX12

## (undated) DEVICE — DAVINCI XI DRAPE COLUMN 470341

## (undated) DEVICE — DRAPE LAP/CHOLE W/O POUCH 89225

## (undated) DEVICE — SU WND CLOSURE VLOC 90 ABS 3-0 VIOLET 9" CV-23 VLOCM0844

## (undated) DEVICE — DRAPE POUCH INSTRUMENT 3 POCKET 1018L

## (undated) DEVICE — GOWN IMPERVIOUS BREATHABLE SMART XLG 89045

## (undated) DEVICE — SUTURE VICRYL+ 1-0 36IN CT-1 UND VCP947H

## (undated) DEVICE — LUBRICANT INST ELECTROLUBE EL101

## (undated) DEVICE — DAVINCI XI DRAPE ARM 470015

## (undated) DEVICE — PROTECTOR ARM STANDARD ONE STEP

## (undated) DEVICE — ANTIFOG SOLUTION SEE SHARP 150M TROCAR SWABS 30978

## (undated) DEVICE — SU VICRYL+ 0 27 UR6 VLT VCP603H

## (undated) DEVICE — SURGICEL ENDOSCOPIC APPLICATOR FOR ORC POWDER 3123SPEA

## (undated) DEVICE — DRAPE U SPLIT 74X120" 29440

## (undated) DEVICE — BLADE KNIFE SURG 11 371111

## (undated) DEVICE — DECANTER VIAL 2006S

## (undated) DEVICE — NDL INSUFFLATION 13GA 120MM C2201

## (undated) DEVICE — CLIP ENDO HEMO-LOC PURPLE LG 544240

## (undated) DEVICE — ENDO POUCH UNIV RETRIEVAL SYSTEM INZII 10MM CD001

## (undated) DEVICE — SU VICRYL+ 3-0 RB1 27IN VIO VCP305H

## (undated) DEVICE — ENDO TROCAR FIRST ENTRY KII FIOS Z-THRD 05X100MM CTF03

## (undated) DEVICE — GLOVE BIOGEL PI ORTHOPRO SZ 7.5 47675

## (undated) DEVICE — CATH FOLEY 18FR 5ML LUBRICATH LATEX 0165L18

## (undated) DEVICE — DRAPE IOBAN INCISE 23X17" 6650EZ

## (undated) DEVICE — MITT PRE-OP 7 L X 5 1/2 W 5177M1

## (undated) DEVICE — BLADE CLIPPER DISP 4406

## (undated) DEVICE — DAVINCI HOT SHEARS TIP COVER  400180

## (undated) DEVICE — ESU PENCIL SMOKE EVAC W/ROCKER SWITCH 0703-047-000

## (undated) DEVICE — DEVICE CLOSURE V-LOC 90 3-0 CV-23 9IN VLOCM1944

## (undated) DEVICE — DAVINCI XI OBTURATOR BLADELESS 8MM 470359

## (undated) DEVICE — PREP CHLORAPREP 26ML TINTED HI-LITE ORANGE 930815

## (undated) DEVICE — GLOVE UNDER INDICATOR PI SZ 7.0 LF 41670

## (undated) DEVICE — DAVINCI XI SEAL UNIVERSAL 5-12MM 470500

## (undated) DEVICE — SU CLIP LAPRA TY XC200

## (undated) DEVICE — DRAPE SHEET REV FOLD 3/4 9349

## (undated) DEVICE — PREP DYNA-HEX 4% CHG SCRUB 4OZ BOTTLE MDS098710

## (undated) DEVICE — SU MONOCRYL+ 4-0 18IN PS2 UND MCP496G

## (undated) DEVICE — GLOVE SURG PI ULTRA TOUCH M SZ 7 LF 42670

## (undated) DEVICE — SOL WATER IRRIG 1000ML BOTTLE 2F7114

## (undated) DEVICE — SUTURE VICRYL+ 1 27IN CT-1 UND VCP261H

## (undated) DEVICE — ENDO SHEARS RENEW LAP ENDOCUT SCISSOR TIP 16.5MM 3142

## (undated) DEVICE — SU MONOCRYL 3-0 RB-1 27" Y305H

## (undated) DEVICE — ESU GROUND PAD ADULT REM W/15' CORD E7507DB

## (undated) DEVICE — SURGICEL POWDER ABSORBABLE HEMOSTAT 3GM 3013SP

## (undated) DEVICE — PAD POS XL 1X20X40IN PINK PIGAZZI

## (undated) RX ORDER — FENTANYL CITRATE-0.9 % NACL/PF 10 MCG/ML
PLASTIC BAG, INJECTION (ML) INTRAVENOUS
Status: DISPENSED
Start: 2024-08-19

## (undated) RX ORDER — ONDANSETRON 2 MG/ML
INJECTION INTRAMUSCULAR; INTRAVENOUS
Status: DISPENSED
Start: 2024-08-19

## (undated) RX ORDER — BUPIVACAINE HYDROCHLORIDE 5 MG/ML
INJECTION, SOLUTION EPIDURAL; INTRACAUDAL
Status: DISPENSED
Start: 2024-08-19

## (undated) RX ORDER — DEXAMETHASONE SODIUM PHOSPHATE 10 MG/ML
INJECTION, SOLUTION INTRAMUSCULAR; INTRAVENOUS
Status: DISPENSED
Start: 2024-08-19

## (undated) RX ORDER — LIDOCAINE HYDROCHLORIDE 10 MG/ML
INJECTION, SOLUTION EPIDURAL; INFILTRATION; INTRACAUDAL; PERINEURAL
Status: DISPENSED
Start: 2024-08-19

## (undated) RX ORDER — FENTANYL CITRATE 50 UG/ML
INJECTION, SOLUTION INTRAMUSCULAR; INTRAVENOUS
Status: DISPENSED
Start: 2024-08-19